# Patient Record
Sex: MALE | Race: WHITE | NOT HISPANIC OR LATINO | URBAN - NONMETROPOLITAN AREA
[De-identification: names, ages, dates, MRNs, and addresses within clinical notes are randomized per-mention and may not be internally consistent; named-entity substitution may affect disease eponyms.]

---

## 2017-02-27 ENCOUNTER — COMMUNICATION - GICH (OUTPATIENT)
Dept: FAMILY MEDICINE | Facility: OTHER | Age: 62
End: 2017-02-27

## 2017-02-28 ENCOUNTER — HISTORY (OUTPATIENT)
Dept: FAMILY MEDICINE | Facility: OTHER | Age: 62
End: 2017-02-28

## 2017-02-28 ENCOUNTER — OFFICE VISIT - GICH (OUTPATIENT)
Dept: FAMILY MEDICINE | Facility: OTHER | Age: 62
End: 2017-02-28

## 2017-02-28 DIAGNOSIS — E78.49 OTHER HYPERLIPIDEMIA: ICD-10-CM

## 2017-02-28 DIAGNOSIS — I10 ESSENTIAL (PRIMARY) HYPERTENSION: ICD-10-CM

## 2017-02-28 DIAGNOSIS — R09.89 OTHER SPECIFIED SYMPTOMS AND SIGNS INVOLVING THE CIRCULATORY AND RESPIRATORY SYSTEMS: ICD-10-CM

## 2017-02-28 LAB
A/G RATIO - HISTORICAL: 1.3 (ref 1–2)
ALBUMIN SERPL-MCNC: 4.4 G/DL (ref 3.5–5.7)
ALP SERPL-CCNC: 58 IU/L (ref 34–104)
ALT (SGPT) - HISTORICAL: 27 IU/L (ref 7–52)
ANION GAP - HISTORICAL: 7 (ref 5–18)
AST SERPL-CCNC: 21 IU/L (ref 13–39)
BILIRUB SERPL-MCNC: 0.4 MG/DL (ref 0.3–1)
BUN SERPL-MCNC: 13 MG/DL (ref 7–25)
BUN/CREAT RATIO - HISTORICAL: 15
CALCIUM SERPL-MCNC: 9.8 MG/DL (ref 8.6–10.3)
CHLORIDE SERPLBLD-SCNC: 97 MMOL/L (ref 98–107)
CHOL/HDL RATIO - HISTORICAL: 4.85
CHOLESTEROL TOTAL: 189 MG/DL
CO2 SERPL-SCNC: 29 MMOL/L (ref 21–31)
CREAT SERPL-MCNC: 0.84 MG/DL (ref 0.7–1.3)
GFR IF NOT AFRICAN AMERICAN - HISTORICAL: >60 ML/MIN/1.73M2
GLOBULIN - HISTORICAL: 3.3 G/DL (ref 2–3.7)
GLUCOSE SERPL-MCNC: 96 MG/DL (ref 70–105)
HDLC SERPL-MCNC: 39 MG/DL (ref 23–92)
LDLC SERPL CALC-MCNC: 114 MG/DL
NON-HDL CHOLESTEROL - HISTORICAL: 150 MG/DL
PATIENT STATUS - HISTORICAL: ABNORMAL
POTASSIUM SERPL-SCNC: 4.2 MMOL/L (ref 3.5–5.1)
PROT SERPL-MCNC: 7.7 G/DL (ref 6.4–8.9)
SODIUM SERPL-SCNC: 133 MMOL/L (ref 133–143)
TRIGL SERPL-MCNC: 178 MG/DL

## 2017-03-02 ENCOUNTER — AMBULATORY - GICH (OUTPATIENT)
Dept: FAMILY MEDICINE | Facility: OTHER | Age: 62
End: 2017-03-02

## 2017-03-30 ENCOUNTER — COMMUNICATION - GICH (OUTPATIENT)
Dept: FAMILY MEDICINE | Facility: OTHER | Age: 62
End: 2017-03-30

## 2017-03-30 DIAGNOSIS — I10 ESSENTIAL (PRIMARY) HYPERTENSION: ICD-10-CM

## 2018-01-03 NOTE — TELEPHONE ENCOUNTER
"Patient Information     Patient Name MRN Jasper Scott [id number] Male 1955      Telephone Encounter by Lorna Gaspar RN at 2017 10:05 AM     Author:  Lorna Gaspar RN Service:  (none) Author Type:  (none)     Filed:  2017 10:18 AM Encounter Date:  2017 Status:  Signed     :  Lorna Gaspar RN (NURS- Registered Nurse)              Reason for Disposition    [1] All other patients AND [2] now alert and feels fine(Exception: SIMPLE FAINT due to stress, pain, prolonged standing, or suddenly standing)    Answer Assessment - Initial Assessment Questions  1. ONSET: \"How long were you unconscious?\" (minutes) \"When did it happen?\"      This morning was downstairs - around 530. States he \"zoned out\" and doesn't remember what happened for a couple hours. Thinks he may have fallen asleep again  2. CONTENT: \"What happened during period of unconsciousness?\" (e.g., seizure activity)       Unknown - possibly sleeping  3. MENTAL STATUS: \"Alert and oriented now?\" (oriented x 3 = name, month, location)       Yes  4. TRIGGER: \"What do you think caused the fainting?\" \"What were you doing just before you fainted?\"  (e.g., exercise, sudden standing up, prolonged standing)      Fever from influenza - not true fainting  5. RECURRENT SYMPTOM: \"Have you ever passed out before?\" If so, ask: \"When was the last time?\" and \"What happened that time?\"       Yes, states has these episodes often with fevers.  6. INJURY: \"Did you sustain any injury during the fall?\"       None  7. CARDIAC SYMPTOMS: \"Have you had any of the following symptoms: chest pain, difficulty breathing, palpitations?\"      None  8. NEUROLOGIC SYMPTOMS: \"Have you had any of the following symptoms: headache, numbness, vertigo, weakness?\"      None  9. GI SYMPTOMS: \"Have you had any of the following symptoms: abdominal pain, vomiting, diarrhea, blood in stools?\"      None  10. OTHER SYMPTOMS: \"Do you have any other " "symptoms?\"      Influenza symptoms  11. PREGNANCY: \"Is there any chance you are pregnant?\" \"When was your last menstrual period?\"      N/A    Protocols used: ADULT [id number]-WW    Patient states that he came down with influenza like symptoms on Friday - fever, body aches, chest cold. Patient now reports that this morning his fever was \"very high\" - symptoms included sweating, shaking/shivering, feeling of being \"freezing cold\". Patient went downstairs to sit in the recliner and is unsure if he became unconscious or if he just fell back asleep. Patient reports that this is not abnormal for him when he gets sick like this. Patient has appointment with PCP tomorrow, refuses to be seen by any other provider, [facility], or ED. Will be evaluated tomorrow. Patient advised to report to ED if another episode like this happens and to keep fever under control with medications such as ibuprofen and tylenol. Patient states understanding and will be evaluated sooner if he continues to get worse.  Lorna Gaspar RN............. 2/27/2017 10:17 AM         "

## 2018-01-03 NOTE — NURSING NOTE
Patient Information     Patient Name MRN Jasper Scott [id number] Male 1955      Nursing Note by Yeimy Winkler at 2017 11:30 AM     Author:  Yeimy Winkler Service:  (none) Author Type:  (none)     Filed:  2017 12:10 PM Encounter Date:  2017 Status:  Signed     :  Yeimy Winkler            Patient presents to the clinic today for a cough, and fever x1 week.    Yeimy Winkler ....................  2017   11:47 AM

## 2018-01-03 NOTE — TELEPHONE ENCOUNTER
Patient Information     Patient Name MRN Jasper Scott [id number] Male 1955      Telephone Encounter by Yeimy Winkler at 2017  9:45 AM     Author:  Yeimy Winkler Service:  (none) Author Type:  (none)     Filed:  2017  9:58 AM Encounter Date:  2017 Status:  Signed     :  Yeimy Winkler            Patient states he isn't feeling well. Patient does have an appointment tomorrow with [facility]. He is requesting a sooner appointment, he was notified [facility] does not have anything sooner. He denied scheduling with anyone else. He does state that he is delirious, and will black out for 4 hours at a time. I tried to reach a triage nurse, and then informed him that I would have a triage nurse call him back.     Yeimy Winkler ....................  2017   9:57 AM

## 2018-01-03 NOTE — PROGRESS NOTES
"Patient Information     Patient Name MRN Jasper Scott [id number] Male 1955      Progress Notes by Jimmy Mcdonnell MD at 2017 11:30 AM     Author:  Jimmy Mcdonnell MD Service:  (none) Author Type:  Physician     Filed:  2017 12:52 PM Encounter Date:  2017 Status:  Signed     :  Jimmy Mcdonnell MD (Physician)            SUBJECTIVE:  61 y.o. male who presents for evaluation of chest congestion. He and his wife are leaving for [place] in 3 days. He is coughing and bringing up some sputum. He has had a low-grade fever and mild chills. He's had some aching as well but not severe and no headache. No upper extremity symptoms.    He is also due for lab work for his blood pressure meds.    Additional Review of Systems: see HPI:      Past Medical History      Diagnosis   Date     Carcinomas, basal cell       Multiple basal cell carcinomas       Chest pain       Chest pain - hypertensive response to exercise - cardiology consult 2006.        Degenerative joint disease of shoulder       Malignant melanoma (HC)       Malignant melanoma of the back          Current Outpatient Prescriptions       Medication  Sig Dispense Refill     atenolol (TENORMIN) 25 mg tablet Take 1 tablet by mouth once daily. 90 tablet 4     azithromycin (ZITHROMAX) 250 mg tablet Take 500 mg (2 tabs) by mouth on day 1, then 250 mg (1 tab) daily for days 2-5. 6 tablet 0     hydroCHLOROthiazide (HCTZ) 25 mg tablet Take 0.5 tablets by mouth once daily. 45 tablet 4     No current facility-administered medications for this visit.      Medications have been reviewed by me and are current to the best of my knowledge and ability.      Allergies as of 2017 - Nolan as Reviewed 2017      Allergen  Reaction Noted     Latex *Unknown 2016        OBJECTIVE:  Visit Vitals       /84     Pulse 69     Temp 97.2  F (36.2  C) (Temporal)     Ht 1.721 m (5' 7.75\")     Wt 90.9 kg (200 lb 6.4 oz) "     SpO2 98%     BMI 30.7 kg/m2     EXAM:  EXAM:  General Appearance: Pleasant, alert, appropriate appearance for age. No acute distress  Ear Exam: Normal.  Nose Exam: Normal.  OroPharynx Exam: Normal.  Neck Exam: Supple, no masses or nodes.  Chest/Respiratory Exam: Diffuse rhonchus breath sounds are heard both posteriorly and anteriorly over the mainstem bronchi. No rales or wheezes.  Cardiovascular Exam: Regular rate and rhythm. S1, S2, no murmur, click, gallop, or rubs.  Skin: no rash or abnormalities        ASSESSMENT/PLAN:  Chest congestion with symptoms and signs consistent with bronchitis. Symptomatic care advised and Zithromax prescribed.    Hypertension and hyperlipidemia-meds refilled and labs pending.  Jimmy Mcdonnell MD ....................  2/28/2017   12:52 PM

## 2018-01-04 NOTE — TELEPHONE ENCOUNTER
Patient Information     Patient Name MRN Sex Jasper Peters [id number] Male 1955      Telephone Encounter by Rupali Espinosa RN at 3/30/2017 11:01 AM     Author:  Rupali Espinosa RN Service:  (none) Author Type:  NURS- Registered Nurse     Filed:  3/30/2017 11:05 AM Encounter Date:  3/30/2017 Status:  Signed     :  Rupali Espinosa RN (NURS- Registered Nurse)            Refill requests for atenolol and hydrochlorothiazide refused.  Both were eRx 17 for 3 month supply and 4 refills to [facility].  Unable to complete prescription refill per RN Medication Refill Policy.................... Rupali Espinosa RN ....................  3/30/2017   11:03 AM

## 2018-01-26 VITALS
DIASTOLIC BLOOD PRESSURE: 84 MMHG | SYSTOLIC BLOOD PRESSURE: 138 MMHG | WEIGHT: 200.4 LBS | TEMPERATURE: 97.2 F | BODY MASS INDEX: 30.37 KG/M2 | OXYGEN SATURATION: 98 % | HEART RATE: 69 BPM | HEIGHT: 68 IN

## 2018-02-12 ENCOUNTER — DOCUMENTATION ONLY (OUTPATIENT)
Dept: FAMILY MEDICINE | Facility: OTHER | Age: 63
End: 2018-02-12

## 2018-02-12 PROBLEM — C43.9 MALIGNANT MELANOMA (H): Status: ACTIVE | Noted: 2018-02-12

## 2018-02-12 PROBLEM — C44.90 MALIGNANT NEOPLASM OF SKIN: Status: ACTIVE | Noted: 2018-02-12

## 2018-02-12 RX ORDER — ATENOLOL 25 MG/1
25 TABLET ORAL DAILY
COMMUNITY
Start: 2017-02-28 | End: 2019-12-09

## 2018-02-12 RX ORDER — HYDROCHLOROTHIAZIDE 25 MG/1
12.5 TABLET ORAL DAILY
COMMUNITY
Start: 2017-02-28 | End: 2018-03-23

## 2018-03-23 DIAGNOSIS — I10 BENIGN ESSENTIAL HYPERTENSION: Primary | ICD-10-CM

## 2018-03-23 NOTE — LETTER
March 28, 2018      Jasper Waldron  [address] [address] [address]        Dear Jasper,     This letter is to remind you that you are due for your annual exam/labs with Jimmy Mcdonnell. Your last comprehensive visit was more than 12 months ago.    A LIMITED refill of     Hydrochlorothiazide (HYDRODIURIL) 25 mg tab  Atenolol (TENORMIN) 50 mg tablet    have been requested from your provider. Additional refills require you to complete this appointment.    Please call the clinic at [phone] to schedule your appointment.    If you should require additional refills before your scheduled appointment, please contact your pharmacy and we will refill your medication until the date of your appointment.    If you are no longer seeing Jimmy Mcdonnell for primary care, please call to let us know. Doing so will remove you from our call/contact list.      Thank you for choosing [facility] and [facility] for your health care needs.    Sincerely,    Refill RN  [facility]

## 2018-03-28 PROBLEM — I10 BENIGN ESSENTIAL HYPERTENSION: Status: ACTIVE | Noted: 2018-03-28

## 2018-03-28 NOTE — TELEPHONE ENCOUNTER
PLEASE REVIEW, SIGN AND SEND AS APPROPRIATE: THANK YOU.    Patient was due for annual exam on 02/28/2018.    Atenolol failed [facility] refill protocol due to no normal creatinine or potassium on file in the past 12 months. They were last checked on 02/28/2017.    CMP ordered and reminder letter sent to Patient regarding annual exam/labs.     Hydrochlorothiazide (HYDRODIURIL) 25 mg tab  Last Written Prescription Date:  2/28/17  Last Fill Quantity: 45,   # refills: 4  Last Office Visit: 2/28/17  Future Office visit:   None.    Atenolol (TENORMIN) 50 mg tablet  Last Written Prescription Date:  2/28/17  Last Fill Quantity: 90,   # refills: 4  Last Office Visit: 2/28/17  Future Office visit:   None.    Routing refill request to provider for review/approval because:  Added diagnosis of Hypertension-Essential Benign to problem list to associate diagnosis for medication. Please change if incorrect. Hypertension not listed on problem list for [facility] or Epic charts. Patient is on 2 medications and they are both for hypertension.    Rupali Bianchi RN .............. 3/28/2018  1:03 PM

## 2018-03-29 RX ORDER — HYDROCHLOROTHIAZIDE 25 MG/1
TABLET ORAL
Qty: 45 TABLET | Refills: 0 | Status: SHIPPED | OUTPATIENT
Start: 2018-03-29 | End: 2018-04-30

## 2018-03-29 RX ORDER — ATENOLOL 50 MG/1
TABLET ORAL
Qty: 45 TABLET | Refills: 0 | Status: SHIPPED | OUTPATIENT
Start: 2018-03-29 | End: 2018-04-30

## 2018-04-30 ENCOUNTER — OFFICE VISIT (OUTPATIENT)
Dept: FAMILY MEDICINE | Facility: OTHER | Age: 63
End: 2018-04-30
Attending: PHYSICAL MEDICINE & REHABILITATION
Payer: COMMERCIAL

## 2018-04-30 VITALS
DIASTOLIC BLOOD PRESSURE: 76 MMHG | WEIGHT: 206.4 LBS | SYSTOLIC BLOOD PRESSURE: 134 MMHG | BODY MASS INDEX: 31.62 KG/M2 | HEART RATE: 74 BPM

## 2018-04-30 DIAGNOSIS — N52.9 ERECTILE DYSFUNCTION, UNSPECIFIED ERECTILE DYSFUNCTION TYPE: Primary | ICD-10-CM

## 2018-04-30 DIAGNOSIS — I10 BENIGN ESSENTIAL HYPERTENSION: ICD-10-CM

## 2018-04-30 DIAGNOSIS — C43.59 MALIGNANT MELANOMA OF TORSO EXCLUDING BREAST (H): ICD-10-CM

## 2018-04-30 DIAGNOSIS — Z86.0101 H/O ADENOMATOUS POLYP OF COLON: ICD-10-CM

## 2018-04-30 LAB
ALBUMIN SERPL-MCNC: 4.4 G/DL (ref 3.5–5.7)
ALBUMIN UR-MCNC: NEGATIVE MG/DL
ALP SERPL-CCNC: 64 U/L (ref 34–104)
ALT SERPL W P-5'-P-CCNC: 30 U/L (ref 7–52)
ANION GAP SERPL CALCULATED.3IONS-SCNC: 7 MMOL/L (ref 3–14)
APPEARANCE UR: CLEAR
AST SERPL W P-5'-P-CCNC: 27 U/L (ref 13–39)
BACTERIA #/AREA URNS HPF: ABNORMAL /HPF
BASOPHILS # BLD AUTO: 0.1 10E9/L (ref 0–0.2)
BASOPHILS NFR BLD AUTO: 0.9 %
BILIRUB SERPL-MCNC: 0.5 MG/DL (ref 0.3–1)
BILIRUB UR QL STRIP: NEGATIVE
BUN SERPL-MCNC: 13 MG/DL (ref 7–25)
CALCIUM SERPL-MCNC: 9.7 MG/DL (ref 8.6–10.3)
CHLORIDE SERPL-SCNC: 100 MMOL/L (ref 98–107)
CHOLEST SERPL-MCNC: 205 MG/DL
CO2 SERPL-SCNC: 32 MMOL/L (ref 21–31)
COLOR UR AUTO: YELLOW
CREAT SERPL-MCNC: 0.86 MG/DL (ref 0.7–1.3)
DIFFERENTIAL METHOD BLD: NORMAL
EOSINOPHIL # BLD AUTO: 0.1 10E9/L (ref 0–0.7)
EOSINOPHIL NFR BLD AUTO: 2.4 %
ERYTHROCYTE [DISTWIDTH] IN BLOOD BY AUTOMATED COUNT: 12.3 % (ref 10–15)
GFR SERPL CREATININE-BSD FRML MDRD: 90 ML/MIN/1.7M2
GLUCOSE SERPL-MCNC: 96 MG/DL (ref 70–105)
GLUCOSE UR STRIP-MCNC: NEGATIVE MG/DL
HCT VFR BLD AUTO: 42.1 % (ref 40–53)
HDLC SERPL-MCNC: 41 MG/DL (ref 23–92)
HGB BLD-MCNC: 14.4 G/DL (ref 13.3–17.7)
HGB UR QL STRIP: ABNORMAL
IMM GRANULOCYTES # BLD: 0 10E9/L (ref 0–0.4)
IMM GRANULOCYTES NFR BLD: 0.2 %
KETONES UR STRIP-MCNC: NEGATIVE MG/DL
LDLC SERPL CALC-MCNC: 122 MG/DL
LEUKOCYTE ESTERASE UR QL STRIP: ABNORMAL
LYMPHOCYTES # BLD AUTO: 2.6 10E9/L (ref 0.8–5.3)
LYMPHOCYTES NFR BLD AUTO: 44.6 %
MCH RBC QN AUTO: 31.1 PG (ref 26.5–33)
MCHC RBC AUTO-ENTMCNC: 34.2 G/DL (ref 31.5–36.5)
MCV RBC AUTO: 91 FL (ref 78–100)
MONOCYTES # BLD AUTO: 0.5 10E9/L (ref 0–1.3)
MONOCYTES NFR BLD AUTO: 9.2 %
NEUTROPHILS # BLD AUTO: 2.5 10E9/L (ref 1.6–8.3)
NEUTROPHILS NFR BLD AUTO: 42.7 %
NITRATE UR QL: NEGATIVE
NONHDLC SERPL-MCNC: 164 MG/DL
PH UR STRIP: 5.5 PH (ref 5–7)
PLATELET # BLD AUTO: 196 10E9/L (ref 150–450)
POTASSIUM SERPL-SCNC: 4.1 MMOL/L (ref 3.5–5.1)
PROT SERPL-MCNC: 7.2 G/DL (ref 6.4–8.9)
RBC # BLD AUTO: 4.63 10E12/L (ref 4.4–5.9)
RBC #/AREA URNS AUTO: ABNORMAL /HPF
SODIUM SERPL-SCNC: 139 MMOL/L (ref 134–144)
SOURCE: ABNORMAL
SP GR UR STRIP: 1.02 (ref 1–1.03)
TRIGL SERPL-MCNC: 208 MG/DL
UROBILINOGEN UR STRIP-ACNC: 0.2 EU/DL (ref 0.2–1)
WBC # BLD AUTO: 5.7 10E9/L (ref 4–11)
WBC #/AREA URNS AUTO: ABNORMAL /HPF

## 2018-04-30 PROCEDURE — 80061 LIPID PANEL: CPT | Performed by: FAMILY MEDICINE

## 2018-04-30 PROCEDURE — 36415 COLL VENOUS BLD VENIPUNCTURE: CPT | Performed by: FAMILY MEDICINE

## 2018-04-30 PROCEDURE — 99214 OFFICE O/P EST MOD 30 MIN: CPT | Performed by: FAMILY MEDICINE

## 2018-04-30 PROCEDURE — 85025 COMPLETE CBC W/AUTO DIFF WBC: CPT | Performed by: FAMILY MEDICINE

## 2018-04-30 PROCEDURE — 81001 URINALYSIS AUTO W/SCOPE: CPT | Performed by: FAMILY MEDICINE

## 2018-04-30 PROCEDURE — 80053 COMPREHEN METABOLIC PANEL: CPT | Performed by: FAMILY MEDICINE

## 2018-04-30 RX ORDER — ATENOLOL 25 MG/1
25 TABLET ORAL DAILY
Qty: 90 TABLET | Refills: 4 | Status: SHIPPED | OUTPATIENT
Start: 2018-04-30 | End: 2019-06-02

## 2018-04-30 RX ORDER — SILDENAFIL CITRATE 20 MG/1
TABLET ORAL
Qty: 30 TABLET | Refills: 11 | Status: SHIPPED | OUTPATIENT
Start: 2018-04-30

## 2018-04-30 RX ORDER — HYDROCHLOROTHIAZIDE 25 MG/1
TABLET ORAL
Qty: 45 TABLET | Refills: 4 | Status: SHIPPED | OUTPATIENT
Start: 2018-04-30 | End: 2019-05-29

## 2018-04-30 NOTE — NURSING NOTE
Patient presents to clinic for medication check and labs.  Lauren Castillo ....................  4/30/2018   8:44 AM

## 2018-04-30 NOTE — LETTER
April 30, 2018      Jasper Waldron  [address] [place] [address]        Dear ,    I am writing to inform you of your test results.    Your lab work essentially looks fine.  Your cholesterol has gone up a bit but not significantly, and the triglycerides, although a bit high, do not need to be treated unless her over 400.  You could consider taking 1 fish oil capsule a day which would likely help the triglycerides.    The remainder of the lab looks just fine and I would recommend repeating it again in 1 year.    Resulted Orders   Comprehensive metabolic panel   Result Value Ref Range    Sodium 139 134 - 144 mmol/L    Potassium 4.1 3.5 - 5.1 mmol/L    Chloride 100 98 - 107 mmol/L    Carbon Dioxide 32 (H) 21 - 31 mmol/L    Anion Gap 7 3 - 14 mmol/L    Glucose 96 70 - 105 mg/dL    Urea Nitrogen 13 7 - 25 mg/dL    Creatinine 0.86 0.70 - 1.30 mg/dL    GFR Estimate 90 >60 mL/min/1.7m2    GFR Estimate If Black >90 >60 mL/min/1.7m2    Calcium 9.7 8.6 - 10.3 mg/dL    Bilirubin Total 0.5 0.3 - 1.0 mg/dL    Albumin 4.4 3.5 - 5.7 g/dL    Protein Total 7.2 6.4 - 8.9 g/dL    Alkaline Phosphatase 64 34 - 104 U/L    ALT 30 7 - 52 U/L    AST 27 13 - 39 U/L   CBC with platelets differential   Result Value Ref Range    WBC 5.7 4.0 - 11.0 10e9/L    RBC Count 4.63 4.4 - 5.9 10e12/L    Hemoglobin 14.4 13.3 - 17.7 g/dL    Hematocrit 42.1 40.0 - 53.0 %    MCV 91 78 - 100 fl    MCH 31.1 26.5 - 33.0 pg    MCHC 34.2 31.5 - 36.5 g/dL    RDW 12.3 10.0 - 15.0 %    Platelet Count 196 150 - 450 10e9/L    Diff Method Automated Method     % Neutrophils 42.7 %    % Lymphocytes 44.6 %    % Monocytes 9.2 %    % Eosinophils 2.4 %    % Basophils 0.9 %    % Immature Granulocytes 0.2 %    Absolute Neutrophil 2.5 1.6 - 8.3 10e9/L    Absolute Lymphocytes 2.6 0.8 - 5.3 10e9/L    Absolute Monocytes 0.5 0.0 - 1.3 10e9/L    Absolute Eosinophils 0.1 0.0 - 0.7 10e9/L    Absolute Basophils 0.1 0.0 - 0.2 10e9/L    Abs Immature Granulocytes 0.0 0 -  0.4 10e9/L   Lipid Profile   Result Value Ref Range    Cholesterol 205 (H) <200 mg/dL    Triglycerides 208 (H) <150 mg/dL      Comment:      Borderline high:  150-199 mg/dl  High:             200-499 mg/dl  Very high:       >499 mg/dl      HDL Cholesterol 41 23 - 92 mg/dL    LDL Cholesterol Calculated 122 (H) <100 mg/dL      Comment:      Above desirable:  100-129 mg/dl  Borderline High:  130-159 mg/dL  High:             160-189 mg/dL  Very high:       >189 mg/dl      Non HDL Cholesterol 164 (H) <130 mg/dL      Comment:      Above Desirable:  130-159 mg/dl  Borderline high:  160-189 mg/dl  High:             190-219 mg/dl  Very high:       >219 mg/dl     *UA reflex to Microscopic   Result Value Ref Range    Color Urine Yellow     Appearance Urine Clear     Glucose Urine Negative NEG^Negative mg/dL    Bilirubin Urine Negative NEG^Negative    Ketones Urine Negative NEG^Negative mg/dL    Specific Gravity Urine 1.025 1.003 - 1.035    Blood Urine Trace (A) NEG^Negative    pH Urine 5.5 5.0 - 7.0 pH    Protein Albumin Urine Negative NEG^Negative mg/dL    Urobilinogen Urine 0.2 0.2 - 1.0 EU/dL    Nitrite Urine Negative NEG^Negative    Leukocyte Esterase Urine Trace (A) NEG^Negative    Source Midstream Urine    Urine Microscopic   Result Value Ref Range    WBC Urine 0 - 5 OTO5^0 - 5 /HPF    RBC Urine O - 2 OTO2^O - 2 /HPF    Bacteria Urine Few (A) NEG^Negative /HPF       If you have any questions or concerns, please call the clinic at the number listed above.       Sincerely,        KENY PATEL MD

## 2018-04-30 NOTE — MR AVS SNAPSHOT
"              After Visit Summary   2018    Jasper Waldron    MRN: [id number]           Patient Information     Date Of Birth          1955        Visit Information        Provider Department      2018 8:45 AM Jimmy Mcdonnell MD [facility]        Today's Diagnoses     Erectile dysfunction, unspecified erectile dysfunction type    -  1    Benign essential hypertension        Malignant melanoma of torso excluding breast (H)        H/O adenomatous polyp of colon           Follow-ups after your visit        Who to contact     If you have questions or need follow up information about today's clinic visit or your schedule please contact [facility] directly at [phone].  Normal or non-critical lab and imaging results will be communicated to you by [employer], letter or phone within 4 business days after the clinic has received the results. If you do not hear from us within 7 days, please contact the clinic through [employer] or phone. If you have a critical or abnormal lab result, we will notify you by phone as soon as possible.  Submit refill requests through [employer] or call your pharmacy and they will forward the refill request to us. Please allow 3 business days for your refill to be completed.          Additional Information About Your Visit        MyChart Information     [employer] lets you send messages to your doctor, view your test results, renew your prescriptions, schedule appointments and more. To sign up, go to [email or web address] . Click on \"Log in\" on the left side of the screen, which will take you to the Welcome page. Then click on \"Sign up Now\" on the right side of the page.     You will be asked to enter the access code listed below, as well as some personal information. Please follow the directions to create your username and password.     Your access code is: [email or web address]  Expires: 2018 10:31 AM     Your access code will  in 90 " days. If you need help or a new code, please call your [place] clinic or [phone].        Care EveryWhere ID     This is your Care EveryWhere ID. This could be used by other organizations to access your [place] medical records  [id number]        Your Vitals Were     Pulse BMI (Body Mass Index)                74 31.62 kg/m2           Blood Pressure from Last 3 Encounters:   04/30/18 134/76   02/28/17 138/84   09/28/16 128/88    Weight from Last 3 Encounters:   04/30/18 206 lb 6.4 oz (93.6 kg)   02/28/17 200 lb 6.4 oz (90.9 kg)   09/28/16 205 lb 3.2 oz (93.1 kg)              We Performed the Following     *UA reflex to Microscopic     CBC with platelets differential     Comprehensive metabolic panel     Lipid Profile     Urine Microscopic          Today's Medication Changes          These changes are accurate as of 4/30/18 10:31 AM.  If you have any questions, ask your nurse or doctor.               Start taking these medicines.        Dose/Directions    sildenafil 20 MG tablet   Commonly known as:  REVATIO   Used for:  Erectile dysfunction, unspecified erectile dysfunction type   Started by:  Jimmy Mcdonnell MD        Take 40 to 100 mg po 2 hours before intercourse   Quantity:  30 tablet   Refills:  11         These medicines have changed or have updated prescriptions.        Dose/Directions    * atenolol 25 MG tablet   Commonly known as:  TENORMIN   This may have changed:  Another medication with the same name was added. Make sure you understand how and when to take each.   Changed by:  Jimmy Mcdonnell MD        Dose:  25 mg   Take 25 mg by mouth daily   Refills:  0       * atenolol 25 MG tablet   Commonly known as:  TENORMIN   This may have changed:  You were already taking a medication with the same name, and this prescription was added. Make sure you understand how and when to take each.   Used for:  Benign essential hypertension   Changed by:  Jimmy Mcdonnell MD        Dose:  25 mg    Take 1 tablet (25 mg) by mouth daily   Quantity:  90 tablet   Refills:  4       hydrochlorothiazide 25 MG tablet   Commonly known as:  HYDRODIURIL   This may have changed:  See the new instructions.   Used for:  Benign essential hypertension   Changed by:  Jimmy Mcdonnell MD        TAKE 1/2 TABLET BY MOUTH ONCE DAILY   Quantity:  45 tablet   Refills:  4       * Notice:  This list has 2 medication(s) that are the same as other medications prescribed for you. Read the directions carefully, and ask your doctor or other care provider to review them with you.         Where to get your medicines      These medications were sent to [id number] ([facility]) - [place], [place] - [address]  [address], [place] [address]     Phone:  [phone]     atenolol 25 MG tablet    hydrochlorothiazide 25 MG tablet         Some of these will need a paper prescription and others can be bought over the counter.  Ask your nurse if you have questions.     Bring a paper prescription for each of these medications     sildenafil 20 MG tablet                Primary Care Provider Office Phone # Fax #    Jimmy Mcdonnell [phone]       [address]  [facility] [address]        Equal Access to Services     JORDY YAN AH: Hadii carmen montgomery hadasho Soomaali, waaxda luqadaha, qaybta kaalmada adeegyada, savi jackson. So [facility] [phone].    ATENCIÓN: Si habla español, tiene a pérez disposición servicios gratuitos de asistencia lingüística. Llame al [phone].    We comply with applicable federal civil rights laws and [place] laws. We do not discriminate on the basis of race, color, national origin, age, disability, sex, sexual orientation, or gender identity.            Thank you!     Thank you for choosing [facility]  for your care. Our goal is always to provide you with excellent care. Hearing back from our patients is  one way we can continue to improve our services. Please take a few minutes to complete the written survey that you may receive in the mail after your visit with us. Thank you!             Your Updated Medication List - Protect others around you: Learn how to safely use, store and throw away your medicines at www.disposemymeds.org.          This list is accurate as of 4/30/18 10:32 AM.  Always use your most recent med list.                   Brand Name Dispense Instructions for use Diagnosis    * atenolol 25 MG tablet    TENORMIN     Take 25 mg by mouth daily        * atenolol 25 MG tablet    TENORMIN    90 tablet    Take 1 tablet (25 mg) by mouth daily    Benign essential hypertension       hydrochlorothiazide 25 MG tablet    HYDRODIURIL    45 tablet    TAKE 1/2 TABLET BY MOUTH ONCE DAILY    Benign essential hypertension       sildenafil 20 MG tablet    REVATIO    30 tablet    Take 40 to 100 mg po 2 hours before intercourse    Erectile dysfunction, unspecified erectile dysfunction type       * Notice:  This list has 2 medication(s) that are the same as other medications prescribed for you. Read the directions carefully, and ask your doctor or other care provider to review them with you.

## 2018-04-30 NOTE — PROGRESS NOTES
HPI-In today for medication refill.  He has been feeling fine.  He sees his dermatologist every 6 months because of melanoma and several basal cell cancers, the last one on his ear.  He has had heavy sun exposure over the years, especially when he was young, but also he vacations in [place] every year.  He has noted no new symptoms or problems otherwise.    His medications remain the same.  He does need refills.  In addition he is interested in trying Viagra.  He got a prescription for Cialis 2 years ago, but found that it was incredibly expensive.  He wonders if there is another option.    He feels well otherwise with no new problems or concerns.    Past Medical History:   Diagnosis Date     Basal cell carcinoma of skin     Multiple basal cell carcinomas     Chest pain     ,Chest pain - hypertensive response to exercise - cardiology consult 2006.     Malignant melanoma of skin (H)     Malignant melanoma of the back     Primary osteoarthritis of shoulder     No Comments Provided     Past Surgical History:   Procedure Laterality Date     COLONOSCOPY      10/31/2016,F/U      Family History   Problem Relation Age of Onset     Family History Negative Father      Good Health,alive and well     Hypertension Mother      Hypertension,Heart failure and coronary artery bypass surgery x 2     HEART DISEASE Paternal Grandfather      Heart Disease,Myocardial infarction     HEART DISEASE Other      Heart Disease,Two cousinsMyocardial infarction, one at age 46 .  One cousin with a bypass in his 50s.     Social History   Substance Use Topics     Smoking status: Former Smoker     Smokeless tobacco: Former User     Alcohol use Yes      Comment: Alcoholic Drinks/day: 1-2 beers a day     Allergies   Allergen Reactions     Latex Unknown     Itch , red        Current Outpatient Prescriptions:      atenolol (TENORMIN) 25 MG tablet, Take 25 mg by mouth daily, Disp: , Rfl:      atenolol (TENORMIN) 25 MG tablet, Take 1  tablet (25 mg) by mouth daily, Disp: 90 tablet, Rfl: 4     hydrochlorothiazide (HYDRODIURIL) 25 MG tablet, TAKE 1/2 TABLET BY MOUTH ONCE DAILY, Disp: 45 tablet, Rfl: 4     sildenafil (REVATIO) 20 MG tablet, Take 40 to 100 mg po 2 hours before intercourse, Disp: 30 tablet, Rfl: 11     [DISCONTINUED] atenolol (TENORMIN) 50 MG tablet, TAKE 1/2 TABLET (25MG) BY MOUTH EVERY DAY (Patient not taking: Reported on 4/30/2018), Disp: 45 tablet, Rfl: 0     [DISCONTINUED] hydrochlorothiazide (HYDRODIURIL) 25 MG tablet, TAKE 1/2 TABLET BY MOUTH ONCE DAILY, Disp: 45 tablet, Rfl: 0    ROS:  See HPI: Weight is been relatively stable.  He has had a recent URI with a cough, but no fever, chills, or night sweats.  Eyes, ENT, cardiopulmonary, GI, , rheumatologic, hematologic, skin, lymph, neurologic, psychiatric and endocrine are all negative other than what is noted in the HPI and above.    Physical exam-he is a pleasant, cooperative,  62 year old male   in noapparent distress.  HEENT-within normal limits for age.  Neck-supple with no adenopathy, thyromegaly, or bruits.  Good carotid pulses.  Lungs-clear with good air movement.  No rales, rhonchi, or wheezes are heard  Heart-regular rhythm with no murmur or gallop appreciated.  Abdomen-soft and nontender with no organomegaly or masses, bowel sounds are normal.  No bruits are heard.  Back-no CVA or low back tenderness.  -normal penis and testicles.  No hernia.  No lesions are seen.  Rectal-normal sphincter tone.  Normal prostate with no nodules, tenderness, or asymmetry.  Empty rectal vault.  Extremities-no cyanosis, clubbing, or edema.  Normal distal pulses.  Skin-no significant lesions or rashes are noted.  He does have multiple moles of different types and as mentioned above, follows up with dermatologist every 6 months.  Lymphatic-no abnormal nodes are noted in the cervical area, supra and infra clavicular, axillary, and inguinal.  Neurologic-intact and  nonfocal.  Psychiatric-alert and oriented.  Normal mood and affect.    Lab-pending  Imaging-none indicated    Assessment-hypertension with good control.  Repeat blood pressure by me was 134/76.  Medication was refilled for another year.  Laboratory studies done, he will be notified of the results.    History of melanoma- no suspicious lesions noted.  He will continue every six-month dermatology follow-up.    ED- prescription for sildenafil 20 mg was given to use anywhere from  mg.  Instructions were given.    History of colon polyp- colonoscopy is due in 2021    Plan-meds refilled.  Labs done, he will be notified of the result.  Otherwise if all goes well follow-up at yearly intervals.  Continue every 6 months dermatology appointments.    KENY PATEL....................  4/30/2018   10:27 AM

## 2019-05-29 DIAGNOSIS — I10 BENIGN ESSENTIAL HYPERTENSION: ICD-10-CM

## 2019-05-29 NOTE — LETTER
[facility]  [address]  [place] [id number] [phone]        May 31, 2019      Jasper Waldron  [address] [place] [address]          Dear Mr. Waldron,    APPOINTMENT REMINDER:   Our records indicates that it is time for you to be seen for an annual medication management appointment with labs.     Your current medication request will be approved for one 90-day refill, but you will need to be seen before any additional refills can be approved.  Please be aware that Dr. Mcdonnell has retired from clinic practice, so you will need to make an appointment with a new provider in the clinic.     Taking care of your health is important to us, and ongoing visits with your provider are vital to your care. You may call our office at [phone] to schedule a visit.    Please disregard this notice if you have already made an appointment. Thank you for choosing [facility] for your health care needs.       Sincerely,        The Refill nurse

## 2019-05-31 RX ORDER — HYDROCHLOROTHIAZIDE 25 MG/1
TABLET ORAL
Qty: 45 TABLET | Refills: 0 | Status: SHIPPED | OUTPATIENT
Start: 2019-05-31 | End: 2019-08-31

## 2019-05-31 NOTE — TELEPHONE ENCOUNTER
Prescription approved per [facility] Protocol.  Patient is now due for annual medication management and labs, needs to establish with new provider. Letter sent, and ana refill given.  Rupali Bentley RN on 5/31/2019 at 8:20 AM

## 2019-06-02 DIAGNOSIS — I10 BENIGN ESSENTIAL HYPERTENSION: ICD-10-CM

## 2019-06-04 RX ORDER — ATENOLOL 25 MG/1
TABLET ORAL
Qty: 90 TABLET | Refills: 0 | Status: SHIPPED | OUTPATIENT
Start: 2019-06-04 | End: 2019-08-31

## 2019-06-04 NOTE — TELEPHONE ENCOUNTER
Prescription approved per [facility].  Patient is now due for annual medication management and labs. Reminder letter sent, and ana refill given.  Rupali Bentley RN on 6/4/2019 at 2:07 PM

## 2019-08-31 DIAGNOSIS — I10 BENIGN ESSENTIAL HYPERTENSION: Primary | ICD-10-CM

## 2019-09-06 NOTE — TELEPHONE ENCOUNTER
Routing refill request to provider for review/approval because:  It has been more than 1 year since last office visit. LOV 4-30-18  Needs to establish with new provider (jayesh Mcdonnell)    Last ana refills on 5-30-19 and 6-4-19 for 90 days. Letter had been sent and message to pharmacy, no response. [facility] R[facility] Bent[facility], RN on 9/6/2019 at 12:57 PM

## 2019-09-09 RX ORDER — ATENOLOL 25 MG/1
TABLET ORAL
Qty: 90 TABLET | Refills: 0 | Status: SHIPPED | OUTPATIENT
Start: 2019-09-09

## 2019-09-09 RX ORDER — HYDROCHLOROTHIAZIDE 25 MG/1
TABLET ORAL
Qty: 45 TABLET | Refills: 0 | Status: SHIPPED | OUTPATIENT
Start: 2019-09-09

## 2019-09-09 NOTE — TELEPHONE ENCOUNTER
Unable to reach Patient. In clinical absence of Dr. Mcdonnell or new PCP, patient is requesting that this message be sent to the primary provider's Teamlet for consideration please.      Rupali Bianchi RN .............. 9/9/2019  4:03 PM

## 2019-11-29 DIAGNOSIS — I10 BENIGN ESSENTIAL HYPERTENSION: ICD-10-CM

## 2019-11-30 NOTE — PROGRESS NOTES
Patient Information     Patient Name MRN Sex Jasper Peters [id number] Male 1955      Progress Notes signed by Keny Patel MD at 3/2/2017  1:38 PM      Author:  Keny Patel MD Service:  (none) Author Type:  Physician     Filed:  3/2/2017  1:38 PM Encounter Date:  3/2/2017 Status:  Signed     :  Keny Patel MD (Physician)            -  2017        JASPER WALDRON  [address] [place] [address]      RE:  JASPER WALDRON  MR#:  [id number]  :  1955    Dear Mr. Waldron:    Enclosed is a copy of your lab work. As you can see, your blood profile is totally normal. Your cholesterol profile looks just fine, with the cholesterol down from 231 a year ago.     Overall, this lab work looks fine. It should just be repeated again in a year.     Sincerely yours,           KENY PATEL MD    WR/sk  Voice Job ID: [id number]  Text Job ID:  [id number]    cc:  Enclosure: Comprehensive metabolic panel and lipid panel of 2017  RE:  JASPER WALDRON  MR#:  [id number]  Page 1         Non operative  off Abx  ID consulted  Aspiration precautions   DC planning. Discussed with social work

## 2019-12-03 NOTE — TELEPHONE ENCOUNTER
Attempted to reach patient. Got answering machine with last name. Left details below and the number to call to set up an appointment.  Fabian Brantley LPN,..............12/3/2019 10:38 AM

## 2019-12-03 NOTE — TELEPHONE ENCOUNTER
[facility] requesting refill: Atenolol 25mg & Hydrodiuril 25mg    Routing refill request to provider for review/approval because:  Flora given x1 and patient did not follow up, please advise  Labs not current:    Patient needs to be seen because it has been more than 1 year since last office visit.    Hydrodiuril 25mg  Diuretics (Including Combos) Protocol [id number] 9:44 AM   Blood pressure under 140/90 in past 12 months    Recent (12 mo) or future (30 days) visit within the authorizing provider's specialty    Normal serum creatinine on file in past 12 months    Normal serum potassium on file in past 12 months    Normal serum sodium on file in past 12 months     Atenalol- 25mg  Beta-Blockers Protocol Lijeju00/3 9:44 AM   Blood pressure under 140/90 in past 12 months    Recent (12 mo) or future (30 days) visit within the authorizing provider's specialty         LOV: 4/30/2018  No future visit.     Letter sent to patient on 6/2/2019  Melanie Campa RN on 12/3/2019 at 9:49 AM

## 2019-12-09 RX ORDER — HYDROCHLOROTHIAZIDE 25 MG/1
TABLET ORAL
Qty: 45 TABLET | Refills: 0
Start: 2019-12-09

## 2019-12-09 RX ORDER — ATENOLOL 25 MG/1
TABLET ORAL
Qty: 90 TABLET | Refills: 0
Start: 2019-12-09

## 2019-12-09 NOTE — TELEPHONE ENCOUNTER
Patient states he is now being seen by provider at [facility] and [facility] has sent refill requests to wrong facility.  He will contact pharmacy and notify them.    Rupali Bourgeois LPN............12/9/2019 10:26 AM